# Patient Record
Sex: MALE | URBAN - METROPOLITAN AREA
[De-identification: names, ages, dates, MRNs, and addresses within clinical notes are randomized per-mention and may not be internally consistent; named-entity substitution may affect disease eponyms.]

---

## 2022-07-22 ENCOUNTER — OFFICE VISIT (OUTPATIENT)
Dept: URBAN - METROPOLITAN AREA CLINIC 44 | Facility: CLINIC | Age: 82
End: 2022-07-22
Payer: MEDICARE

## 2022-07-22 DIAGNOSIS — H40.1134 PRIMARY OPEN-ANGLE GLAUCOMA, INDETERMINATE, BILATERAL: Primary | ICD-10-CM

## 2022-07-22 DIAGNOSIS — H35.371 PUCKERING OF MACULA, RIGHT EYE: ICD-10-CM

## 2022-07-22 DIAGNOSIS — Z96.1 PRESENCE OF INTRAOCULAR LENS: ICD-10-CM

## 2022-07-22 DIAGNOSIS — Z79.84 LONG TERM (CURRENT) USE OF ORAL HYPOGLYCEMIC DRUGS: ICD-10-CM

## 2022-07-22 DIAGNOSIS — E11.9 TYPE 2 DIABETES MELLITUS W/O COMPLICATION: ICD-10-CM

## 2022-07-22 PROCEDURE — 92134 CPTRZ OPH DX IMG PST SGM RTA: CPT | Performed by: OPTOMETRIST

## 2022-07-22 PROCEDURE — 92133 CPTRZD OPH DX IMG PST SGM ON: CPT | Performed by: OPTOMETRIST

## 2022-07-22 PROCEDURE — 99204 OFFICE O/P NEW MOD 45 MIN: CPT | Performed by: OPTOMETRIST

## 2022-07-22 ASSESSMENT — VISUAL ACUITY
OS: 20/20
OD: 20/25

## 2022-07-22 ASSESSMENT — INTRAOCULAR PRESSURE
OD: 15
OS: 16

## 2022-07-22 NOTE — IMPRESSION/PLAN
Impression: Puckering of macula, right eye: H35.371. Plan: Discussed findings with patient. Observe. RTC 12 months for complete + possible OCT(Mac).  Sooner if changes in vision are observed

## 2022-07-22 NOTE — IMPRESSION/PLAN
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 =IOP OD 15, OS 16.
-Vertical cupping OD .70, OS .51 OD with RNFL loss (S,I) OS with RNFL loss (I) Average OD 82, OS 70, 
- + Fm Hx.  Plan: PLAN: Cont Latanoprost 1 drop at night and Simbrinza BID OU and RTC 4-6 weeks for 24-2 VF and IOP check + PACHs

## 2022-07-22 NOTE — IMPRESSION/PLAN
Impression: Type 2 diabetes mellitus w/o complication: I28.0. No vascular abnormalities noted per exam and OCT. Plan: PLAN: Discussed findings. Stressed importance of regular follow ups with PCP. Discussed with patient to maintain A1C at 7.0 or below will greatly decreased chances/progression of retinopathy. RTC 12 months for complete and OCT mac.  OPTOS (If DM for >10yrs)

## 2022-08-30 ENCOUNTER — OFFICE VISIT (OUTPATIENT)
Dept: URBAN - METROPOLITAN AREA CLINIC 44 | Facility: CLINIC | Age: 82
End: 2022-08-30
Payer: MEDICARE

## 2022-08-30 DIAGNOSIS — H40.1134 PRIMARY OPEN-ANGLE GLAUCOMA, INDETERMINATE, BILATERAL: Primary | ICD-10-CM

## 2022-08-30 PROCEDURE — 92083 EXTENDED VISUAL FIELD XM: CPT | Performed by: OPTOMETRIST

## 2022-08-30 PROCEDURE — 76514 ECHO EXAM OF EYE THICKNESS: CPT | Performed by: OPTOMETRIST

## 2022-08-30 PROCEDURE — 99213 OFFICE O/P EST LOW 20 MIN: CPT | Performed by: OPTOMETRIST

## 2022-08-30 ASSESSMENT — INTRAOCULAR PRESSURE
OS: 15
OD: 14

## 2022-08-30 NOTE — IMPRESSION/PLAN
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 =IOP OD 14, OS 15.
-Vertical cupping OD .70, OS .51 OD with RNFL loss (S,I) OS with RNFL loss (I) Average OD 82, OS 70, 
- VF#1 OD Nasal step defects (VFI 84% 8/22) OS nasal step defects (VFI 93% 8/22) + Fm Hx.  Plan: PLAN: Cont Latanoprost 1 drop at night and Simbrinza BID OU and RTC 4 months for 24-2 VF and IOP check

## 2022-12-27 NOTE — IMPRESSION/PLAN
CALLED AND SPOKE WITH PATIENT.  INSURANCE WILL BE REMAINING THE SAME IN 2023 REQUIRED FOR SURGERY Impression: Presence of intraocular lens: Z96.1. Plan: PLAN: RTC 12 months for complete. Check position of IOL.

## 2022-12-30 ENCOUNTER — OFFICE VISIT (OUTPATIENT)
Dept: URBAN - METROPOLITAN AREA CLINIC 44 | Facility: CLINIC | Age: 82
End: 2022-12-30
Payer: MEDICARE

## 2022-12-30 DIAGNOSIS — H40.1132 PRIMARY OPEN-ANGLE GLAUCOMA, MODERATE STAGE, BILATERAL: Primary | ICD-10-CM

## 2022-12-30 DIAGNOSIS — H40.1134 PRIMARY OPEN-ANGLE GLAUCOMA, BILATERAL, INDETERMINATE STAGE: ICD-10-CM

## 2022-12-30 PROCEDURE — 92083 EXTENDED VISUAL FIELD XM: CPT | Performed by: OPTOMETRIST

## 2022-12-30 PROCEDURE — 99213 OFFICE O/P EST LOW 20 MIN: CPT | Performed by: OPTOMETRIST

## 2022-12-30 ASSESSMENT — INTRAOCULAR PRESSURE
OD: 12
OS: 13

## 2022-12-30 NOTE — IMPRESSION/PLAN
Impression: Primary open-angle glaucoma, moderate stage, bilateral: H40.1132. =IOP OD 12, OS 13.
-Vertical cupping OD .70, OS .51 OD with RNFL loss (S,I) OS with RNFL loss (I) Average OD 82, OS 70, 
- VF#1 OD sup and Nasal step defects (VFI 89% 12/22 vs 84% 8/22) OS inf and nasal step defects (VFI 85% 12/22 vs 93% 8/22) 
- + Fm Hx. Plan: PLAN: Discussed findings. Target IOP <16 OU. Continue with Simbrinza 1 gtt BID OU.  RTC 6 months for complete + RNFL

## 2023-06-28 ENCOUNTER — OFFICE VISIT (OUTPATIENT)
Dept: URBAN - METROPOLITAN AREA CLINIC 44 | Facility: CLINIC | Age: 83
End: 2023-06-28
Payer: MEDICARE

## 2023-06-28 DIAGNOSIS — Z96.1 PRESENCE OF INTRAOCULAR LENS: ICD-10-CM

## 2023-06-28 DIAGNOSIS — E11.9 TYPE 2 DIABETES MELLITUS W/O COMPLICATION: Primary | ICD-10-CM

## 2023-06-28 DIAGNOSIS — H35.371 PUCKERING OF MACULA, RIGHT EYE: ICD-10-CM

## 2023-06-28 DIAGNOSIS — H40.1132 PRIMARY OPEN-ANGLE GLAUCOMA, MODERATE STAGE, BILATERAL: ICD-10-CM

## 2023-06-28 PROCEDURE — 92014 COMPRE OPH EXAM EST PT 1/>: CPT | Performed by: OPTOMETRIST

## 2023-06-28 PROCEDURE — 92133 CPTRZD OPH DX IMG PST SGM ON: CPT | Performed by: OPTOMETRIST

## 2023-06-28 ASSESSMENT — VISUAL ACUITY
OS: 20/25
OD: 20/25

## 2023-06-28 ASSESSMENT — INTRAOCULAR PRESSURE
OD: 9
OS: 10

## 2023-06-28 ASSESSMENT — KERATOMETRY
OS: 39.50
OD: 39.63

## 2023-06-28 NOTE — IMPRESSION/PLAN
Impression: Primary open-angle glaucoma, moderate stage, bilateral: H40.1132. =IOP 9, 10
-Vertical cupping OD .73, OS .64 OD with RNFL loss (S,I) OS with RNFL loss (S) Average OD 61, OS 67, 
- VF#1 OD sup and Nasal step defects (VFI 89% 12/22 vs 84% 8/22) OS inf and nasal step defects (VFI 85% 12/22 vs 93% 8/22) 
- + Fm Hx. Plan: PLAN: Discussed findings. Target IOP <16 OU. Continue with Simbrinza 1 gtt BID OU.  RTC 6 months for 24-2 VF and IOP check

## 2023-06-28 NOTE — IMPRESSION/PLAN
Impression: Type 2 diabetes mellitus w/o complication: X34.1. No vascular abnormalities noted per exam and OCT. Plan: PLAN: Discussed findings. Stressed importance of glycemic control (Target A1C <7.0) and continued care with PCP. RTC 12 months for complete exam + OCT (Mac) + OPTOS (DM Hx of more than 10 yrs), + report to PCP.

## 2023-06-28 NOTE — IMPRESSION/PLAN
Impression: Puckering of macula, right eye: H35.371. Macular thickening. No ME noted. Patient has no significant visual complaints at this time. Plan: PLAN: Observe. RTC 12 months for complete + OCT(Mac).  Sooner if changes in vision are observed

## 2023-12-28 ENCOUNTER — OFFICE VISIT (OUTPATIENT)
Dept: URBAN - METROPOLITAN AREA CLINIC 44 | Facility: CLINIC | Age: 83
End: 2023-12-28
Payer: COMMERCIAL

## 2023-12-28 DIAGNOSIS — H35.371 PUCKERING OF MACULA, RIGHT EYE: ICD-10-CM

## 2023-12-28 DIAGNOSIS — H40.1132 PRIMARY OPEN-ANGLE GLAUCOMA, MODERATE STAGE, BILATERAL: Primary | ICD-10-CM

## 2023-12-28 PROCEDURE — 92083 EXTENDED VISUAL FIELD XM: CPT | Performed by: OPTOMETRIST

## 2023-12-28 PROCEDURE — 99213 OFFICE O/P EST LOW 20 MIN: CPT | Performed by: OPTOMETRIST

## 2023-12-28 RX ORDER — LATANOPROST 50 UG/ML
0.005 % SOLUTION OPHTHALMIC
Qty: 7.5 | Refills: 3 | Status: ACTIVE
Start: 2023-12-28

## 2023-12-28 ASSESSMENT — INTRAOCULAR PRESSURE
OS: 14
OD: 12

## 2024-07-31 ENCOUNTER — OFFICE VISIT (OUTPATIENT)
Dept: URBAN - METROPOLITAN AREA CLINIC 44 | Facility: CLINIC | Age: 84
End: 2024-07-31
Payer: COMMERCIAL

## 2024-07-31 DIAGNOSIS — H35.371 PUCKERING OF MACULA, RIGHT EYE: ICD-10-CM

## 2024-07-31 DIAGNOSIS — H40.1132 PRIMARY OPEN-ANGLE GLAUCOMA, MODERATE STAGE, BILATERAL: Primary | ICD-10-CM

## 2024-07-31 DIAGNOSIS — E11.9 TYPE 2 DIABETES MELLITUS W/O COMPLICATION: ICD-10-CM

## 2024-07-31 PROCEDURE — 92134 CPTRZ OPH DX IMG PST SGM RTA: CPT | Performed by: OPTOMETRIST

## 2024-07-31 PROCEDURE — 99214 OFFICE O/P EST MOD 30 MIN: CPT | Performed by: OPTOMETRIST

## 2024-07-31 PROCEDURE — 92083 EXTENDED VISUAL FIELD XM: CPT | Performed by: OPTOMETRIST

## 2024-07-31 RX ORDER — BRINZOLAMIDE/BRIMONIDINE TARTRATE 10; 2 MG/ML; MG/ML
SUSPENSION/ DROPS OPHTHALMIC
Qty: 5 | Refills: 3 | Status: ACTIVE
Start: 2024-07-31

## 2024-07-31 ASSESSMENT — INTRAOCULAR PRESSURE
OD: 11
OS: 10

## 2024-07-31 ASSESSMENT — VISUAL ACUITY
OS: 20/25
OD: 20/20

## 2024-07-31 ASSESSMENT — KERATOMETRY
OS: 39.00
OD: 39.25